# Patient Record
Sex: FEMALE | Race: WHITE | NOT HISPANIC OR LATINO | Employment: OTHER | URBAN - METROPOLITAN AREA
[De-identification: names, ages, dates, MRNs, and addresses within clinical notes are randomized per-mention and may not be internally consistent; named-entity substitution may affect disease eponyms.]

---

## 2023-02-23 LAB — URINE CULTURE: NO GROWTH

## 2023-05-19 ENCOUNTER — HOSPITAL ENCOUNTER (OUTPATIENT)
Dept: DATA CONVERSION | Facility: HOSPITAL | Age: 63
Setting detail: OBSERVATION
Discharge: HOME | End: 2023-05-20
Attending: SURGERY | Admitting: SURGERY

## 2023-05-19 DIAGNOSIS — M19.90 UNSPECIFIED OSTEOARTHRITIS, UNSPECIFIED SITE: ICD-10-CM

## 2023-05-19 DIAGNOSIS — M79.7 FIBROMYALGIA: ICD-10-CM

## 2023-05-19 DIAGNOSIS — K21.9 GASTRO-ESOPHAGEAL REFLUX DISEASE WITHOUT ESOPHAGITIS: ICD-10-CM

## 2023-05-19 DIAGNOSIS — Z91.040 LATEX ALLERGY STATUS: ICD-10-CM

## 2023-05-19 DIAGNOSIS — K22.0 ACHALASIA OF CARDIA: ICD-10-CM

## 2023-05-19 DIAGNOSIS — Z88.0 ALLERGY STATUS TO PENICILLIN: ICD-10-CM

## 2023-05-19 DIAGNOSIS — Z79.85 LONG-TERM (CURRENT) USE OF INJECTABLE NON-INSULIN ANTIDIABETIC DRUGS: ICD-10-CM

## 2023-05-19 DIAGNOSIS — Z91.041 RADIOGRAPHIC DYE ALLERGY STATUS: ICD-10-CM

## 2023-05-19 DIAGNOSIS — F32.A DEPRESSION, UNSPECIFIED: ICD-10-CM

## 2023-05-19 DIAGNOSIS — K76.0 FATTY (CHANGE OF) LIVER, NOT ELSEWHERE CLASSIFIED: ICD-10-CM

## 2023-05-19 DIAGNOSIS — G43.909 MIGRAINE, UNSPECIFIED, NOT INTRACTABLE, WITHOUT STATUS MIGRAINOSUS: ICD-10-CM

## 2023-05-19 DIAGNOSIS — Z79.84 LONG TERM (CURRENT) USE OF ORAL HYPOGLYCEMIC DRUGS: ICD-10-CM

## 2023-05-19 DIAGNOSIS — E11.9 TYPE 2 DIABETES MELLITUS WITHOUT COMPLICATIONS (MULTI): ICD-10-CM

## 2023-05-19 DIAGNOSIS — D64.9 ANEMIA, UNSPECIFIED: ICD-10-CM

## 2023-05-19 DIAGNOSIS — K58.9 IRRITABLE BOWEL SYNDROME WITHOUT DIARRHEA: ICD-10-CM

## 2023-05-19 DIAGNOSIS — N39.3 STRESS INCONTINENCE (FEMALE) (MALE): ICD-10-CM

## 2023-05-19 DIAGNOSIS — E78.5 HYPERLIPIDEMIA, UNSPECIFIED: ICD-10-CM

## 2023-05-19 DIAGNOSIS — M54.50 LOW BACK PAIN, UNSPECIFIED: ICD-10-CM

## 2023-05-19 DIAGNOSIS — R13.10 DYSPHAGIA, UNSPECIFIED: ICD-10-CM

## 2023-05-19 DIAGNOSIS — I10 ESSENTIAL (PRIMARY) HYPERTENSION: ICD-10-CM

## 2023-05-19 DIAGNOSIS — M54.2 CERVICALGIA: ICD-10-CM

## 2023-05-19 DIAGNOSIS — R00.0 TACHYCARDIA, UNSPECIFIED: ICD-10-CM

## 2023-05-19 DIAGNOSIS — J45.909 UNSPECIFIED ASTHMA, UNCOMPLICATED (HHS-HCC): ICD-10-CM

## 2023-05-19 DIAGNOSIS — F41.9 ANXIETY DISORDER, UNSPECIFIED: ICD-10-CM

## 2023-05-19 LAB
POCT GLUCOSE: 120 MG/DL (ref 74–99)
POCT GLUCOSE: 169 MG/DL (ref 74–99)
POCT GLUCOSE: 238 MG/DL (ref 74–99)

## 2023-05-20 LAB
POCT GLUCOSE: 111 MG/DL (ref 74–99)
POCT GLUCOSE: 127 MG/DL (ref 74–99)
POCT GLUCOSE: 96 MG/DL (ref 74–99)

## 2023-06-25 LAB — URINE CULTURE: ABNORMAL

## 2023-09-07 VITALS — BODY MASS INDEX: 31.07 KG/M2 | WEIGHT: 186.51 LBS | HEIGHT: 65 IN

## 2023-09-30 NOTE — H&P
History & Physical Reviewed:   I have reviewed the History and Physical dated:  04-May-2023   History and Physical reviewed and relevant findings noted. Patient examined to review pertinent physical  findings.: No significant changes   Home Medications Reviewed: no changes noted   Allergies Reviewed: no changes noted       ERAS (Enhanced Recovery After Surgery):  ·  ERAS Patient: no     Consent:   COVID-19 Consent:  ·  COVID-19 Risk Consent Surgeon has reviewed key risks related to the risk of rasheed COVID-19 and if they contract COVID-19 what the risks are.     Attestation:   Note Completion:  I am a:  Resident/Fellow   Attending Attestation I saw and evaluated the patient.  I personally obtained the key and critical portions of the history and physical exam or was physically present for key and  critical portions performed by the resident/fellow. I reviewed the resident/fellow?s documentation and discussed the patient with the resident/fellow.  I agree with the resident/fellow?s medical decision making as documented in the note.     I personally evaluated the patient on 19-May-2023         Electronic Signatures:  Efrain Johnson)  (Signed 19-May-2023 06:43)   Authored: History & Physical Reviewed, ERAS, Consent,  Note Completion  Dipak De Leon)  (Signed 19-May-2023 09:21)   Authored: Note Completion   Co-Signer: History & Physical Reviewed, ERAS, Consent, Note Completion      Last Updated: 19-May-2023 09:21 by Dipak De Leon)

## 2023-09-30 NOTE — DISCHARGE SUMMARY
Send Summary:   Discharge Summary Providers:  Provider Role Provider Name   · Referring Nohemi Zee   · Attending Dipak De Leon   · Primary ERIC Romo       Note Recipients: Dipak De Leon MD - [id number]  [Preferred]       Discharge:    Summary:   Admission Date: .19-May-2023 06:29:00   Discharge Date: 20-May-2023   Attending Physician at Discharge: Dipak De Leon   Admission Reason: Achalasia   Final Discharge Diagnoses: S/P peroral endoscopic  myotomy   Procedures: Date: 19-May-2023 09:18:00  Procedure Name: 1. POEM   Condition at Discharge: Satisfactory   Disposition at Discharge: .Home   Hospital Course:    Ms. Shruthi Fan is a 63 y/o female status post peroral endoscopic myotomy for achalasia on 5/19/23 with Dr. De Leon. She tolerated the procedure well with post-endoscope  pain well managed. She is tolerating her diet, ambulating, and having bowel movements. She had an oxygen requirement initially post-op and has been working with the incentive spirometer. She has been weaned off and is medically clear for discharge with  post-op follow up planned for two weeks with Dr. De Leon.       Discharge Information:    and Continuing Care:   Lab Results - Pending:    None  Radiology Results - Pending: None   Discharge Instructions:    Activity:           activity as tolerated.          May shower..            May return to school/work.            May not drive.  While taking narcotic pain medications    Nutrition/Diet:           full liquids    Follow Up Appointments:    Coumadin (Warfarin) Follow-Up Monitoring:           Phone Number:   [phone]    Follow-Up Appointment 01:           Physician/Dept/Service:   [facility]          Reason for Referral:   Please call to schedule follow up appointment for two weeks from now.          Call to Schedule in:   2-3 days          Phone Number:   [phone]    Discharge Medications: Home Medication   ZyrTEC 10 mg oral tablet - 1 tab(s)  orally once a day  Breo Ellipta 100 mcg-25 mcg/inh inhalation powder - 1 puff(s) inhaled once a day  atenolol 100 mg oral tablet - orally once a day  amLODIPine 10 mg oral tablet - 1 tab(s) orally once a day  atorvastatin 10 mg oral tablet - 1 tab(s) orally once a day  desvenlafaxine (as base) 100 mg oral tablet, extended release - 1 tab(s) orally once a day in the morning   ezetimibe 10 mg oral tablet - 1 tab(s) orally once a day  Gemtesa 75 mg oral tablet - 1 tab(s) orally once a day  losartan 100 mg oral tablet - 1 tab(s) orally once a day in the morning   zolpidem 10 mg oral tablet - 1 tab(s) orally once a day (at bedtime)  triamterene-hydrochlorothiazide 37.5 mg-25 mg oral capsule - 1 cap(s) orally once a day in the morning   QUEtiapine 200 mg oral tablet, extended release - 1 tab(s) orally once a day (in the evening) at 7pm   metFORMIN 500 mg oral tablet - 1 tab(s) orally once a day  busPIRone 15 mg oral tablet - 1 tab(s) orally 3 times a day  Azo cranberry oral capsule - 2 cap(s) orally once a day  omeprazole 20 mg oral delayed release capsule - 1 cap(s) orally 3 times a day  Trintellix 20 mg oral tablet - 1 tab(s) orally once a day  Trulicity Pen 1.5 mg/0.5 mL subcutaneous solution - inject 1.5 mg under the skin every wednesday   Cinnamon -  2 capsules (1000 milligram(s)) orally once a day   Benadryl 25 mg oral tablet - 1 tab(s) orally 2 times a day  Ciprodex 0.3%-0.1% otic suspension - 4 drop(s) to  both ears 2 times a day  dicyclomine 20 mg oral tablet - 1 tab(s) orally 4 times a day  EPINEPHrine 0.3 mg injectable kit - inject 0.3mg under the skin as needed for anaphylaxis   montelukast 10 mg oral tablet - 1 tab(s) orally once a day  Narcan 4 mg/0.1 mL nasal spray - use as directed as needed for opioid overdose   pramipexole 1 mg oral tablet - 1 tab(s) orally once a day at dinner   PreviDent 5000 Dry Mouth 1.1% topical gel - Apply topically to affected areas twice daily as directed   Jardiance 10 mg oral  tablet - 1 tab(s) orally once a day (in the morning)  Potassium Chloride (Eqv-K-Tab) 20 mEq oral tablet, extended release - 1 tab(s) orally 3 times a day  pregabalin 150 mg oral capsule - 1 cap(s) orally 4 times a day  triamcinolone 0.1% topical cream - Apply topically to affected area as needed   hyoscyamine 0.125 mg sublingual tablet - 1 tab(s) sublingually 3 to 4 times a day as needed   PreserVision AREDS 2 oral tablet, chewable - 1 tab(s) orally 2 times a day  Refresh ophthalmic solution - 1 drop(s) to both eyes 4 times a day  Lacri-Lube S.O.P. ophthalmic ointment - 1 application to both  eyes once a day at bedtime      PRN Medication   Imodium 2 mg oral capsule - 1 cap(s) orally every 4 hours, As Needed  rizatriptan 10 mg oral tablet - 1 tab(s) orally once a day, As Needed  hydrocodone-acetaminophen 5 mg-325 mg oral tablet - 1 tab(s) orally once a day (at bedtime), As Needed  albuterol 90 mcg/inh inhalation aerosol - 2 puff(s) inhaled every 6 hours, As Needed  cyclobenzaprine 10 mg oral tablet - 1 tab(s) orally 3 times a day, As Needed - for spasms     DNR Status:   ·  Code Status Code Status order at time of discharge: Full Code     Attestation:   Note Completion:  I am a:  Resident/Fellow   Attending Attestation I reviewed the resident/fellow?s documentation and discussed the patient with the resident/fellow.  I agree with the resident/fellow?s medical  decision making as documented in the note.          Electronic Signatures:  Dipak De Leon)  (Signed 21-May-2023 17:15)   Authored: Note Completion   Co-Signer: Send Summary, Summary Content, Ongoing Care, DNR Status, Note Completion  Juan Miguel Valle (Resident))  (Signed 20-May-2023 18:57)   Authored: Send Summary, Summary Content, Ongoing Care,  DNR Status, Note Completion      Last Updated: 21-May-2023 17:15 by Dipak De Leon)

## 2023-10-02 NOTE — OP NOTE
Post Operative Note:     PreOp Diagnosis: Achalasia   Post-Procedure Diagnosis: Achalasia   Procedure: 1. POEM   Surgeon: Dr. De Leon   Resident/Fellow/Other Assistant: Dr. Sami Berger   Anesthesia: General   I.V. Fluids: 500   Estimated Blood Loss (mL): 1ml   Blood Replacement: None   Specimen: no   Complications: None   Findings: myotomy performed without incident, EGJ  open and easily transversed at end of case     Attestation:   Note Completion:  I am a: Resident/Fellow   Attending Attestation I was present for the entire procedure          Electronic Signatures:  Juan Miguel Gallardo (Resident))  (Signed 19-May-2023 09:20)   Authored: Post Operative Note, Note Completion  Dipak De Leon)  (Signed 19-May-2023 09:22)   Authored: Note Completion   Co-Signer: Post Operative Note, Note Completion      Last Updated: 19-May-2023 09:22 by Dipak De Leon)

## 2023-10-16 PROBLEM — G89.29 CHRONIC PAIN OF RIGHT KNEE: Status: ACTIVE | Noted: 2023-10-16

## 2023-10-16 PROBLEM — S83.271A COMPLEX TEAR OF LATERAL MENISCUS OF RIGHT KNEE AS CURRENT INJURY: Status: ACTIVE | Noted: 2023-10-16

## 2023-10-16 PROBLEM — N17.9 ACUTE KIDNEY FAILURE (CMS-HCC): Status: ACTIVE | Noted: 2023-10-16

## 2023-10-16 PROBLEM — M17.11 ARTHRITIS OF KNEE, RIGHT: Status: ACTIVE | Noted: 2023-10-16

## 2023-10-16 PROBLEM — M54.41 ACUTE BILATERAL LOW BACK PAIN WITH RIGHT-SIDED SCIATICA: Status: ACTIVE | Noted: 2023-10-16

## 2023-10-16 PROBLEM — R13.19 ESOPHAGEAL DYSPHAGIA: Status: ACTIVE | Noted: 2023-10-16

## 2023-10-16 PROBLEM — N39.0 UTI (URINARY TRACT INFECTION): Status: ACTIVE | Noted: 2023-10-16

## 2023-10-16 PROBLEM — N39.3 SUI (STRESS URINARY INCONTINENCE, FEMALE): Status: ACTIVE | Noted: 2023-10-16

## 2023-10-16 PROBLEM — N30.10 INTERSTITIAL CYSTITIS: Status: ACTIVE | Noted: 2023-10-16

## 2023-10-16 PROBLEM — R30.0 DYSURIA: Status: ACTIVE | Noted: 2023-10-16

## 2023-10-16 PROBLEM — R35.1 NOCTURIA: Status: ACTIVE | Noted: 2023-10-16

## 2023-10-16 PROBLEM — K22.0 ACHALASIA, ESOPHAGEAL: Status: ACTIVE | Noted: 2023-10-16

## 2023-10-16 PROBLEM — R31.9 HEMATURIA: Status: ACTIVE | Noted: 2023-10-16

## 2023-10-16 PROBLEM — S80.02XA CONTUSION OF LEFT KNEE: Status: ACTIVE | Noted: 2023-10-16

## 2023-10-16 PROBLEM — M25.561 CHRONIC PAIN OF RIGHT KNEE: Status: ACTIVE | Noted: 2023-10-16

## 2023-10-16 RX ORDER — EPINEPHRINE 0.3 MG/.3ML
INJECTION SUBCUTANEOUS
COMMUNITY

## 2023-10-16 RX ORDER — FLUTICASONE FUROATE AND VILANTEROL TRIFENATATE 100; 25 UG/1; UG/1
POWDER RESPIRATORY (INHALATION)
COMMUNITY
Start: 2018-11-26

## 2023-10-16 RX ORDER — DULAGLUTIDE 1.5 MG/.5ML
INJECTION, SOLUTION SUBCUTANEOUS
COMMUNITY
Start: 2020-02-25

## 2023-10-16 RX ORDER — QUETIAPINE FUMARATE 50 MG/1
2 TABLET, FILM COATED ORAL DAILY
COMMUNITY
Start: 2019-03-28

## 2023-10-16 RX ORDER — VORTIOXETINE 20 MG/1
20 TABLET, FILM COATED ORAL
COMMUNITY
Start: 2019-11-07

## 2023-10-16 RX ORDER — LORAZEPAM 0.5 MG/1
0.5 TABLET ORAL
COMMUNITY

## 2023-10-16 RX ORDER — DICYCLOMINE HYDROCHLORIDE 20 MG/1
20 TABLET ORAL 4 TIMES DAILY
COMMUNITY

## 2023-10-16 RX ORDER — LANCETS 33 GAUGE
EACH MISCELLANEOUS
COMMUNITY
Start: 2023-01-07

## 2023-10-16 RX ORDER — MONTELUKAST SODIUM 10 MG/1
1 TABLET ORAL NIGHTLY
COMMUNITY
Start: 2021-05-26

## 2023-10-16 RX ORDER — HYDROCODONE BITARTRATE AND ACETAMINOPHEN 5; 325 MG/1; MG/1
1 TABLET ORAL DAILY PRN
COMMUNITY

## 2023-10-16 RX ORDER — DESVENLAFAXINE 100 MG/1
1 TABLET, EXTENDED RELEASE ORAL
COMMUNITY
Start: 2019-03-28

## 2023-10-16 RX ORDER — PREGABALIN 150 MG/1
150 CAPSULE ORAL 4 TIMES DAILY
COMMUNITY

## 2023-10-16 RX ORDER — URINARY ANTISEPTIC ANTISPASMODIC 81.6; 40.8; 10.8; .12 MG/1; MG/1; MG/1; MG/1
1 TABLET ORAL DAILY
COMMUNITY
Start: 2021-12-01

## 2023-10-16 RX ORDER — CIPROFLOXACIN 250 MG/1
250 TABLET, FILM COATED ORAL 2 TIMES DAILY
COMMUNITY
Start: 2023-03-27

## 2023-10-16 RX ORDER — CRANBERRY CONC/C/BACILL COAG 250-30-15
TABLET ORAL
COMMUNITY

## 2023-10-16 RX ORDER — CYCLOBENZAPRINE HCL 10 MG
10 TABLET ORAL
COMMUNITY
Start: 2019-06-24

## 2023-10-16 RX ORDER — CIPROFLOXACIN AND DEXAMETHASONE 3; 1 MG/ML; MG/ML
SUSPENSION/ DROPS AURICULAR (OTIC)
COMMUNITY

## 2023-10-16 RX ORDER — NYSTATIN 100000 [USP'U]/ML
SUSPENSION ORAL 4 TIMES DAILY
COMMUNITY
Start: 2023-05-08

## 2023-10-16 RX ORDER — TRIAMCINOLONE ACETONIDE 1 MG/G
CREAM TOPICAL
COMMUNITY
Start: 2020-04-29

## 2023-10-16 RX ORDER — POTASSIUM CHLORIDE 1500 MG/1
20 TABLET, EXTENDED RELEASE ORAL
COMMUNITY
Start: 2019-12-17

## 2023-10-16 RX ORDER — CETIRIZINE HYDROCHLORIDE 10 MG/1
10 TABLET ORAL
COMMUNITY

## 2023-10-16 RX ORDER — VIBEGRON 75 MG/1
1 TABLET, FILM COATED ORAL DAILY
COMMUNITY
Start: 2022-04-26 | End: 2023-12-13

## 2023-10-16 RX ORDER — PHENYLEPHRINE HCL 10 MG
TABLET ORAL
COMMUNITY

## 2023-10-16 RX ORDER — METFORMIN HYDROCHLORIDE 500 MG/1
1 TABLET ORAL 2 TIMES DAILY
COMMUNITY
Start: 2018-09-04

## 2023-10-16 RX ORDER — BUSPIRONE HYDROCHLORIDE 15 MG/1
15 TABLET ORAL 3 TIMES DAILY
COMMUNITY
Start: 2019-01-31

## 2023-10-16 RX ORDER — TRIAMTERENE/HYDROCHLOROTHIAZID 37.5-25 MG
TABLET ORAL
COMMUNITY
Start: 2019-06-24

## 2023-10-16 RX ORDER — ATORVASTATIN CALCIUM 10 MG/1
10 TABLET, FILM COATED ORAL DAILY
COMMUNITY
Start: 2018-10-31

## 2023-10-16 RX ORDER — LOSARTAN POTASSIUM 100 MG/1
1 TABLET ORAL DAILY
COMMUNITY
Start: 2018-09-28

## 2023-10-16 RX ORDER — FLUORIDE (SODIUM) 1.1 %
PASTE (ML) DENTAL
COMMUNITY
Start: 2023-04-17

## 2023-10-16 RX ORDER — HYOSCYAMINE SULFATE 0.12 MG/1
0.12 TABLET, ORALLY DISINTEGRATING ORAL 4 TIMES DAILY
COMMUNITY
Start: 2022-11-10

## 2023-10-16 RX ORDER — EMPAGLIFLOZIN 10 MG/1
10 TABLET, FILM COATED ORAL
COMMUNITY
Start: 2023-03-11

## 2023-10-16 RX ORDER — RIZATRIPTAN BENZOATE 10 MG/1
10 TABLET ORAL
COMMUNITY

## 2023-10-16 RX ORDER — FLUCONAZOLE 200 MG/1
200 TABLET ORAL
COMMUNITY
Start: 2023-05-08

## 2023-10-16 RX ORDER — OMEPRAZOLE 20 MG/1
20 CAPSULE, DELAYED RELEASE ORAL
COMMUNITY
Start: 2019-06-24

## 2023-10-16 RX ORDER — NALOXONE HYDROCHLORIDE 0.4 MG/ML
0.4 INJECTION, SOLUTION INTRAMUSCULAR; INTRAVENOUS; SUBCUTANEOUS
COMMUNITY

## 2023-10-16 RX ORDER — PRAMIPEXOLE DIHYDROCHLORIDE 1 MG/1
1 TABLET ORAL
COMMUNITY
Start: 2020-05-26

## 2023-10-16 RX ORDER — ALBUTEROL SULFATE 90 UG/1
AEROSOL, METERED RESPIRATORY (INHALATION)
COMMUNITY

## 2023-10-16 RX ORDER — EZETIMIBE 10 MG/1
10 TABLET ORAL
COMMUNITY
Start: 2019-06-24

## 2023-10-16 RX ORDER — HYOSCYAMINE SULFATE 0.12 MG/1
0.12 TABLET, ORALLY DISINTEGRATING ORAL EVERY 4 HOURS PRN
COMMUNITY
Start: 2023-04-17

## 2023-10-16 RX ORDER — CEPHALEXIN 250 MG/1
250 CAPSULE ORAL 2 TIMES DAILY
COMMUNITY
Start: 2023-02-10

## 2023-10-16 RX ORDER — BENZONATATE 200 MG/1
200 CAPSULE ORAL
COMMUNITY

## 2023-10-16 RX ORDER — AMLODIPINE BESYLATE 10 MG/1
10 TABLET ORAL
COMMUNITY
Start: 2019-06-24

## 2023-10-16 RX ORDER — BLOOD SUGAR DIAGNOSTIC
STRIP MISCELLANEOUS
COMMUNITY

## 2023-10-16 RX ORDER — CLOTRIMAZOLE AND BETAMETHASONE DIPROPIONATE 10; .64 MG/G; MG/G
1 CREAM TOPICAL
COMMUNITY
Start: 2022-11-03

## 2023-10-16 RX ORDER — ZOLPIDEM TARTRATE 10 MG/1
1 TABLET ORAL NIGHTLY PRN
COMMUNITY
Start: 2018-12-06

## 2023-10-16 RX ORDER — DIMETHYL SULFOXIDE 0.54 G/ML
IRRIGANT INTRAVESICAL
COMMUNITY
Start: 2023-02-28

## 2023-10-16 RX ORDER — ATENOLOL 100 MG/1
100 TABLET ORAL
COMMUNITY
Start: 2019-06-24

## 2023-10-25 ENCOUNTER — APPOINTMENT (OUTPATIENT)
Dept: UROLOGY | Facility: CLINIC | Age: 63
End: 2023-10-25
Payer: COMMERCIAL

## 2023-10-25 ENCOUNTER — OFFICE VISIT (OUTPATIENT)
Dept: UROLOGY | Facility: CLINIC | Age: 63
End: 2023-10-25
Payer: COMMERCIAL

## 2023-10-25 VITALS — RESPIRATION RATE: 16 BRPM | WEIGHT: 189 LBS | BODY MASS INDEX: 29.6 KG/M2

## 2023-10-25 DIAGNOSIS — R35.1 NOCTURIA: ICD-10-CM

## 2023-10-25 DIAGNOSIS — N39.3 SUI (STRESS URINARY INCONTINENCE, FEMALE): ICD-10-CM

## 2023-10-25 DIAGNOSIS — N30.10 INTERSTITIAL CYSTITIS: ICD-10-CM

## 2023-10-25 DIAGNOSIS — R30.0 DYSURIA: Primary | ICD-10-CM

## 2023-10-25 PROCEDURE — 1036F TOBACCO NON-USER: CPT | Performed by: UROLOGY

## 2023-10-25 PROCEDURE — 99213 OFFICE O/P EST LOW 20 MIN: CPT | Performed by: UROLOGY

## 2023-10-25 NOTE — PROGRESS NOTES
Subjective   Patient ID: Shruthi Fan is a 62 y.o. female.    HPI  Patient is here for 3 month f/u. Hx of IC.  She has been having flare ups. She has done DMSO irrigations. Last cysto on 3/23.  She is taking Macrodantin 100mg MWF. Chronic LUT'S sx are mild and stable. Some urgency and frequency. Denies dysuria. Denies hematuria. Nocturia x1. No medication for LUT'S.        Review of Systems   Constitutional:  Negative for chills and fever.   HENT: Negative.     Eyes: Negative.    Respiratory:  Negative for cough and shortness of breath.    Cardiovascular:  Negative for chest pain and leg swelling.   Gastrointestinal:  Negative for nausea.   Endocrine: Negative.    Genitourinary:  Negative for difficulty urinating.        Negative except for documented in HPI   Allergic/Immunologic: Negative.    Neurological:         Alert & oriented X 3   Hematological:         Denies blood thinners   Psychiatric/Behavioral: Negative.       Physical Exam  Vitals and nursing note reviewed.   Pulmonary:      Effort: Pulmonary effort is normal.      Breath sounds: Normal breath sounds.   Abdominal:      Palpations: Abdomen is soft.      Tenderness: There is no abdominal tenderness.   Genitourinary:     Comments: Kidneys non palpable bilaterally  Bladder non palpable or tender  Neurological:      Mental Status: She is alert.        Objective       Assessment/Plan     There are no diagnoses linked to this encounter.    Treatment options for LUTS reviewed  Urogesic Blue give  Discussed timed voiding. Discussed fluid and caffeine intake  Lifestyle change to help prevent UTIs discussed. Encouraged fluid intake.  Cx ordered if Sx persisit    F/u 1 month

## 2023-11-29 ASSESSMENT — ENCOUNTER SYMPTOMS
SHORTNESS OF BREATH: 0
NAUSEA: 0
EYES NEGATIVE: 1
ENDOCRINE NEGATIVE: 1
COUGH: 0
FEVER: 0
CHILLS: 0
DIFFICULTY URINATING: 0
PSYCHIATRIC NEGATIVE: 1
ALLERGIC/IMMUNOLOGIC NEGATIVE: 1

## 2023-11-29 NOTE — PROGRESS NOTES
Subjective   Patient ID: Shruthi Fan is a 63 y.o. female.    HPI  Hx of IC.  She has been having flare ups. She has done DMSO irrigations. Last cysto on 3/23.  She is taking Macrodantin 100mg MWF.  Also taking Urogesic blue. Chronic LUT'S sx are mild and stable. Some urgency and frequency. Denies dysuria. Denies hematuria. Nocturia x1. No medication for LUT'S.           Review of Systems   Constitutional:  Negative for chills and fever.   HENT: Negative.     Eyes: Negative.    Respiratory:  Negative for cough and shortness of breath.    Cardiovascular:  Negative for chest pain and leg swelling.   Gastrointestinal:  Negative for nausea.   Endocrine: Negative.    Genitourinary:  Negative for difficulty urinating.        Negative except for documented in HPI   Allergic/Immunologic: Negative.    Neurological:         Alert & oriented X 3   Hematological:         Denies blood thinners   Psychiatric/Behavioral: Negative.         Objective   Physical Exam  Vitals and nursing note reviewed.   Pulmonary:      Effort: Pulmonary effort is normal.      Breath sounds: Normal breath sounds.   Abdominal:      Palpations: Abdomen is soft.      Tenderness: There is no abdominal tenderness.   Genitourinary:     Comments: Kidneys non palpable bilaterally  Bladder non palpable or tender  Neurological:      Mental Status: She is alert.         Assessment/Plan   Diagnoses and all orders for this visit:  Nocturia  ELAINA (stress urinary incontinence, female)  Interstitial cystitis      Treatment options for LUTS reviewed  IC diet reviewed  Continue Urogesic Blue  Discussed timed voiding. Discussed fluid and caffeine intake  Lifestyle change to help prevent UTIs discussed. Encouraged fluid intake.  Discontinue Macrodantin Prophylaxis  Trimethoprim Rx given      F/u  3 months with UA

## 2023-11-30 ENCOUNTER — OFFICE VISIT (OUTPATIENT)
Dept: UROLOGY | Facility: CLINIC | Age: 63
End: 2023-11-30
Payer: COMMERCIAL

## 2023-11-30 VITALS — RESPIRATION RATE: 16 BRPM | WEIGHT: 190 LBS | BODY MASS INDEX: 29.76 KG/M2

## 2023-11-30 DIAGNOSIS — N30.10 INTERSTITIAL CYSTITIS: ICD-10-CM

## 2023-11-30 DIAGNOSIS — R35.1 NOCTURIA: ICD-10-CM

## 2023-11-30 DIAGNOSIS — N39.3 SUI (STRESS URINARY INCONTINENCE, FEMALE): ICD-10-CM

## 2023-11-30 PROCEDURE — 1036F TOBACCO NON-USER: CPT | Performed by: UROLOGY

## 2023-11-30 PROCEDURE — 99214 OFFICE O/P EST MOD 30 MIN: CPT | Performed by: UROLOGY

## 2023-11-30 RX ORDER — TRIMETHOPRIM 100 MG/1
TABLET ORAL
Qty: 15 TABLET | Refills: 6 | Status: SHIPPED | OUTPATIENT
Start: 2023-11-30

## 2023-12-13 DIAGNOSIS — R35.1 NOCTURIA: Primary | ICD-10-CM

## 2023-12-13 RX ORDER — VIBEGRON 75 MG/1
1 TABLET, FILM COATED ORAL DAILY
Qty: 90 TABLET | Refills: 3 | Status: SHIPPED | OUTPATIENT
Start: 2023-12-13 | End: 2024-03-20 | Stop reason: SDUPTHER

## 2024-02-01 ENCOUNTER — OFFICE VISIT (OUTPATIENT)
Dept: SURGERY | Facility: CLINIC | Age: 64
End: 2024-02-01
Payer: COMMERCIAL

## 2024-02-01 VITALS
SYSTOLIC BLOOD PRESSURE: 124 MMHG | BODY MASS INDEX: 30.99 KG/M2 | HEIGHT: 65 IN | OXYGEN SATURATION: 95 % | HEART RATE: 86 BPM | TEMPERATURE: 97.8 F | DIASTOLIC BLOOD PRESSURE: 80 MMHG | WEIGHT: 186 LBS

## 2024-02-01 DIAGNOSIS — K22.0 ACHALASIA: ICD-10-CM

## 2024-02-01 DIAGNOSIS — K22.0 ACHALASIA, ESOPHAGEAL: Primary | ICD-10-CM

## 2024-02-01 PROCEDURE — 1036F TOBACCO NON-USER: CPT | Performed by: SURGERY

## 2024-02-01 PROCEDURE — 99214 OFFICE O/P EST MOD 30 MIN: CPT | Performed by: SURGERY

## 2024-02-01 ASSESSMENT — ENCOUNTER SYMPTOMS
WEAKNESS: 0
LIGHT-HEADEDNESS: 0
COUGH: 1
CHILLS: 0
FEVER: 0
DYSURIA: 0
RHINORRHEA: 0
DIZZINESS: 0
EYE REDNESS: 0
VOMITING: 0
WOUND: 0
PALPITATIONS: 0
JOINT SWELLING: 1
SORE THROAT: 1
NERVOUS/ANXIOUS: 0
NAUSEA: 0
HEMATURIA: 0
ARTHRALGIAS: 1
SHORTNESS OF BREATH: 0
CONFUSION: 0
ABDOMINAL PAIN: 1

## 2024-02-01 ASSESSMENT — PAIN SCALES - GENERAL: PAINLEVEL: 2

## 2024-02-01 NOTE — H&P
General Surgery Outpatient Clinic Note      History Of Present Illness  Shruthi Fan is a 63 y.o. female with achalasia s/p POEM in 5/2023 presenting with hematemesis. Patient did well after her POEM but did not undergo Bravo probe testing postoperatively. Since January, she has started having progressive burning pain from her throat, down through her chest, and into her RUQ/stomach area. This is present constantly but is worsened by oral intake. She reports occasional episodes of emesis of what she describes as gastric acid with at most 1 tsp of bright red blood. She also reports coughing with oral intake but feels like food is going through okay. She is on a PPI TID with no relief. She had symptoms apprx daily initially. Since she has started sleeping upright, eating smaller bites, and drinking more water, her symptoms now occur 2-3 times per week. She reports that her current Sx are reminiscent of what she experienced with her achalasia prior to POEM.     Past Medical History  Patient Active Problem List   Diagnosis    Acute bilateral low back pain with right-sided sciatica    Acute kidney failure (CMS/HCC)    Complex tear of lateral meniscus of right knee as current injury    Dysuria    Esophageal dysphagia    Hematuria    Nocturia    Arthritis of knee, right    Contusion of left knee    ELAINA (stress urinary incontinence, female)    UTI (urinary tract infection)    Achalasia, esophageal    Chronic pain of right knee    Interstitial cystitis   IBS    Surgical History  POEM 5/2023  Past Surgical History:   Procedure Laterality Date    MR HEAD ANGIO WO IV CONTRAST  7/25/2022    MR HEAD ANGIO WO IV CONTRAST 7/25/2022    MR NECK ANGIO WO IV CONTRAST  7/25/2022    MR NECK ANGIO WO IV CONTRAST 7/25/2022    OTHER SURGICAL HISTORY  10/08/2019    Carpal tunnel surgery    OTHER SURGICAL HISTORY  10/08/2019    Gallbladder surgery    OTHER SURGICAL HISTORY  10/08/2019    Lumpectomy    OTHER SURGICAL HISTORY  11/14/2019     Knee surgery        Social History  Neg x3    Family History  Family History   Problem Relation Name Age of Onset    Lung cancer Mother      Breast cancer Mother      Other (CARDIAC DISORDER) Father      Diabetes Father      Hypertension Father      Lung cancer Father      Lung cancer Brother          Allergies  Bacitracin zinc-polymyxin b, Budesonide-formoterol, Doxycycline, Fenofibrate, Fish containing products, Iodinated contrast media, Lisinopril, Midazolam, Nitrofurantoin macrocrystal, Nut - unspecified, Penicillins, Propoxyphene, Shellfish containing products, Simvastatin, Latex, Niacin, and Other    Review of Systems   Constitutional:  Negative for chills and fever.   HENT:  Positive for sore throat (Per HPI). Negative for rhinorrhea.    Eyes:  Negative for redness and visual disturbance.   Respiratory:  Positive for cough (Per HPI). Negative for shortness of breath.    Cardiovascular:  Negative for chest pain and palpitations.   Gastrointestinal:  Positive for abdominal pain (Per HPI). Negative for nausea and vomiting.   Genitourinary:  Negative for dysuria and hematuria.   Musculoskeletal:  Positive for arthralgias (s/p recent TKR) and joint swelling (s/p recent TKR).   Skin:  Negative for rash and wound.   Neurological:  Negative for dizziness, syncope, weakness and light-headedness.   Psychiatric/Behavioral:  Negative for confusion. The patient is not nervous/anxious.        Home Meds   Current Outpatient Medications   Medication Instructions    amLODIPine (NORVASC) 10 mg, oral, TAKE PER DIRECTED    atenolol (TENORMIN) 100 mg, oral, TAKE PER DIRECTED    atorvastatin (LIPITOR) 10 mg, oral, Daily    benzonatate (TESSALON) 200 mg, oral, TAKE PER DIRECTED    Breo Ellipta 100-25 mcg/dose inhaler inhalation, TAKE PER DIRECTED    busPIRone (BUSPAR) 15 mg, oral, 3 times daily    cephalexin (KEFLEX) 250 mg, oral, 2 times daily    cetirizine (ZYRTEC) 10 mg, oral, TAKE PER DIRECTED    Cinnamon 500 mg capsule oral,  TAKE PER DIRECTED    ciprofloxacin (CIPRO) 250 mg, oral, 2 times daily    ciprofloxacin-dexamethasone (Ciprodex) otic suspension otic (ear), APPLY PER DIRECTED    clotrimazole-betamethasone (Lotrisone) cream 1 Application, Topical, APPLY PER DIRECTED    cranberry conc-C-bacillus coag (Azo Cranberry Plus Probiotic) 250-30-15 mg tablet oral, TAKE PER DIRECTED    cyclobenzaprine (FLEXERIL) 10 mg, oral, TAKE PER DIRECTED    desvenlafaxine 100 mg 24 hr tablet 1 tablet, oral, Daily before breakfast    dicyclomine (BENTYL) 20 mg, oral, 4 times daily    EPINEPHrine 0.3 mg/0.3 mL injection syringe intramuscular, PER DIRECTED    ezetimibe (ZETIA) 10 mg, oral, TAKE PER DIRECTED    fluconazole (DIFLUCAN) 200 mg, oral, TAKE PER DIRECTED 1 TIME ONLY . TAKE THE DAY BEFORE PROCEDURE    Gemtesa 75 mg, oral, Daily    HYDROcodone-acetaminophen (Norco) 5-325 mg tablet 1 tablet, oral, Daily PRN    hyoscyamine (ANASPAZ) 0.125 mg, oral, Every 4 hours PRN    hyoscyamine (OSCIMIN SL) 0.125 mg, sublingual, 4 times daily, PER DIRECTED    Jardiance 10 mg, oral, TAKE PER DIRECTED    LORazepam (ATIVAN) 0.5 mg, oral, TAKE PER DIRECTED    losartan (Cozaar) 100 mg tablet 1 tablet, oral, Daily    metFORMIN (Glucophage) 500 mg tablet 1 tablet, oral, 2 times daily    methen-sod phos-meth blue-hyos (Urogesic-Blue) 81.6-40.8-0.12 mg tablet 1 tablet, oral, Daily, TAKE PER DIRECTED    montelukast (Singulair) 10 mg tablet 1 tablet, oral, Nightly    naloxone (NARCAN) 0.4 mg, PER DIRECTED    nystatin (Mycostatin) 100,000 unit/mL suspension oral, 4 times daily, TAKE PER DIRECTED    omeprazole (PRILOSEC) 20 mg, oral, TAKE PER DIRECTED    OneTouch Delica Plus Lancet 33 gauge misc USE PER DIRECTED FOR TESTING BLOOD SUGAR 2 TIMES DAILY    OneTouch Ultra Test strip TEST PER DIRECTED    potassium chloride CR 20 mEq ER tablet 20 mEq, oral, TAKE PER DIRECTED    pramipexole (MIRAPEX) 1 mg, oral, TAKE PER DIRECTED    pregabalin (LYRICA) 150 mg, oral, 4 times daily     PreviDent 5000 Booster Plus 1.1 % dental paste dental, TAKE PER DIRECTED    ProAir HFA 90 mcg/actuation inhaler inhalation, TAKE PER DIRECTED    QUEtiapine (SEROquel) 50 mg tablet 2 tablets, oral, Daily, PER DIRECTED    Rimso-50 50 % intravesical solution intravesical, PER DIRECTED    rizatriptan (MAXALT) 10 mg, oral, TAKE PER DIRECTED    triamcinolone (Kenalog) 0.1 % cream Topical, APPLY PER DIRECTED    triamterene-hydrochlorothiazid (Maxzide-25) 37.5-25 mg tablet oral, PER DIRECTED    trimethoprim (Trimpex) 100 mg tablet Take 1 tab PO EVERY MONDAY, WEDNESDAY, FRIDAY    Trintellix 20 mg, oral, TAKE PER DIRECTED    Trulicity 1.5 mg/0.5 mL pen injector injection subcutaneous, PER DIRECTED    zolpidem (Ambien) 10 mg tablet 1 tablet, oral, Nightly PRN       Last Recorded Vitals    Temp:  [36.6 °C (97.8 °F)] 36.6 °C (97.8 °F)  Heart Rate:  [86] 86  BP: (124)/(80) 124/80      Physical Exam  General: laying in bed, NAD  Head: normocephalic, atraumatic  ENT: mucosa are moist   Respiratory: nonlabored breathing on room air  CV: RRR  GI: abdomen is soft, nontender, nondistended  MSK: NADIA  Extremities: no swelling or deformity of b/l LEs   Neuro: CN II-XII grossly intact. Sensation to light touch intact        Assessment   Ms. Fan, 63yoF with Hx noted above, presenting for hematemesis, abdominal pain, chest pain, and dysphagia. Symptoms could reflect GERD, ERD, or recurrent achalasia. Will need additional work up to assess.    Plan  - schedule for manometry, EGD, and bravo probe placement with Dr. De Leon.  Risks explained including bleeding, infection, perforation, and premature dislodgment of Bravo capsule.  Based on the results of the manometry and Bravo capsule we will consider further medical treatment for reflux disease or consider reintervention for incomplete myotomy if identified.  - counseled patient to stop PPI 1wk prior to procedure   - continue to sleep upright, take small bites, chew well, and drink  water      Discussed with Attending Physician    Patsy Montero MD  General Surgery PGY 5  [facility] [id number]

## 2024-02-20 ASSESSMENT — ENCOUNTER SYMPTOMS
SHORTNESS OF BREATH: 0
DIFFICULTY URINATING: 0
NAUSEA: 0
FEVER: 0
PSYCHIATRIC NEGATIVE: 1
EYES NEGATIVE: 1
ALLERGIC/IMMUNOLOGIC NEGATIVE: 1
CHILLS: 0
ENDOCRINE NEGATIVE: 1
COUGH: 0

## 2024-02-20 NOTE — PROGRESS NOTES
Subjective   Patient ID: Shruthi Fan is a 63 y.o. female.    HPI  Patient is here for 3 month follow up for Hx of IC.   She has done DMSO irrigations. Last cysto on 3/23.  She is taking Trimethoprim 100mg MWF.  Also taking Urogesic blue PRN. Chronic LUT'S sx are mild and stable. Some urgency and frequency. Denies dysuria. Denies hematuria. Nocturia x1. She is taking Gemtesa which has improved frequency.       Review of Systems   Constitutional:  Negative for chills and fever.   HENT: Negative.     Eyes: Negative.    Respiratory:  Negative for cough and shortness of breath.    Cardiovascular:  Negative for chest pain and leg swelling.   Gastrointestinal:  Negative for nausea.   Endocrine: Negative.    Genitourinary:  Negative for difficulty urinating.        Negative except for documented in HPI   Allergic/Immunologic: Negative.    Neurological:         Alert & oriented X 3   Hematological:         Denies blood thinners   Psychiatric/Behavioral: Negative.         Objective   Physical Exam  Vitals and nursing note reviewed.   Pulmonary:      Effort: Pulmonary effort is normal.      Breath sounds: Normal breath sounds.   Abdominal:      Palpations: Abdomen is soft.      Tenderness: There is no abdominal tenderness.   Genitourinary:     Comments: Kidneys non palpable bilaterally  Bladder non palpable or tender  Neurological:      Mental Status: She is alert.         Assessment/Plan   Diagnoses and all orders for this visit:  Interstitial cystitis  Nocturia  ELAINA (stress urinary incontinence, female)  Urinary frequency      Treatment options for LUTS reviewed  Continue Gemtesa-Samples given  Urogesic Blue Rx refilled for PRN use  Discussed timed voiding. Discussed fluid and caffeine intake  Lifestyle change to help prevent UTIs discussed. Encouraged fluid intake.  Continue Trimethoprim QMWF-Rx given  UA reviewed from past      F/u  6 months with UA

## 2024-02-21 ENCOUNTER — APPOINTMENT (OUTPATIENT)
Dept: URBAN - METROPOLITAN AREA CLINIC 204 | Age: 64
Setting detail: DERMATOLOGY
End: 2024-02-22

## 2024-02-21 PROCEDURE — OTHER MIPS QUALITY: OTHER

## 2024-02-21 PROCEDURE — 99213 OFFICE O/P EST LOW 20 MIN: CPT

## 2024-02-21 NOTE — PROCEDURE: MIPS QUALITY
Additional Notes: Documentation for MIPS  purposes only. Full Patient visit note from paper chart is available for review and also scanned in EMA chart.
Quality 226: Preventive Care And Screening: Tobacco Use: Screening And Cessation Intervention: Patient screened for tobacco use and is an ex/non-smoker
Detail Level: Detailed
Quality 111:Pneumonia Vaccination Status For Older Adults: Patient did not receive any pneumococcal conjugate or polysaccharide vaccine on or after their 60th birthday and before the end of the measurement period
Quality 47: Advance Care Plan: Advance Care Planning discussed and documented; advance care plan or surrogate decision maker documented in the medical record.
Quality 110: Preventive Care And Screening: Influenza Immunization: Influenza Immunization not Administered because Patient Refused.

## 2024-02-22 ENCOUNTER — OFFICE VISIT (OUTPATIENT)
Dept: UROLOGY | Facility: CLINIC | Age: 64
End: 2024-02-22
Payer: COMMERCIAL

## 2024-02-22 VITALS — WEIGHT: 184 LBS | RESPIRATION RATE: 16 BRPM | BODY MASS INDEX: 30.62 KG/M2

## 2024-02-22 DIAGNOSIS — R35.0 URINARY FREQUENCY: ICD-10-CM

## 2024-02-22 DIAGNOSIS — R35.1 NOCTURIA: ICD-10-CM

## 2024-02-22 DIAGNOSIS — N39.3 SUI (STRESS URINARY INCONTINENCE, FEMALE): ICD-10-CM

## 2024-02-22 DIAGNOSIS — N30.10 INTERSTITIAL CYSTITIS: ICD-10-CM

## 2024-02-22 PROCEDURE — 1036F TOBACCO NON-USER: CPT | Performed by: UROLOGY

## 2024-02-22 PROCEDURE — 99214 OFFICE O/P EST MOD 30 MIN: CPT | Performed by: UROLOGY

## 2024-02-22 RX ORDER — VIBEGRON 75 MG/1
1 TABLET, FILM COATED ORAL DAILY
Qty: 30 TABLET | Refills: 11 | Status: SHIPPED | OUTPATIENT
Start: 2024-02-22 | End: 2024-03-23

## 2024-02-22 RX ORDER — URINARY ANTISEPTIC ANTISPASMODIC 81.6; 40.8; 10.8; .12 MG/1; MG/1; MG/1; MG/1
TABLET ORAL
Qty: 30 TABLET | Refills: 3 | Status: SHIPPED | OUTPATIENT
Start: 2024-02-22 | End: 2024-03-20 | Stop reason: SDUPTHER

## 2024-03-06 ENCOUNTER — HOSPITAL ENCOUNTER (OUTPATIENT)
Dept: GASTROENTEROLOGY | Facility: HOSPITAL | Age: 64
Setting detail: OUTPATIENT SURGERY
Discharge: HOME | End: 2024-03-06
Payer: COMMERCIAL

## 2024-03-06 VITALS
SYSTOLIC BLOOD PRESSURE: 102 MMHG | DIASTOLIC BLOOD PRESSURE: 57 MMHG | HEART RATE: 83 BPM | OXYGEN SATURATION: 94 % | HEIGHT: 65 IN | WEIGHT: 175 LBS | RESPIRATION RATE: 11 BRPM | BODY MASS INDEX: 29.16 KG/M2

## 2024-03-06 VITALS
HEART RATE: 95 BPM | OXYGEN SATURATION: 98 % | SYSTOLIC BLOOD PRESSURE: 137 MMHG | RESPIRATION RATE: 13 BRPM | DIASTOLIC BLOOD PRESSURE: 80 MMHG

## 2024-03-06 DIAGNOSIS — K22.0 ACHALASIA: ICD-10-CM

## 2024-03-06 PROCEDURE — 43235 EGD DIAGNOSTIC BRUSH WASH: CPT | Performed by: SURGERY

## 2024-03-06 PROCEDURE — 91035 G-ESOPH REFLX TST W/ELECTROD: CPT | Performed by: SURGERY

## 2024-03-06 PROCEDURE — 7100000009 HC PHASE TWO TIME - INITIAL BASE CHARGE

## 2024-03-06 PROCEDURE — 91010 ESOPHAGUS MOTILITY STUDY: CPT | Performed by: SURGERY

## 2024-03-06 PROCEDURE — 91010 ESOPHAGUS MOTILITY STUDY: CPT

## 2024-03-06 PROCEDURE — 7100000010 HC PHASE TWO TIME - EACH INCREMENTAL 1 MINUTE

## 2024-03-06 PROCEDURE — 2500000004 HC RX 250 GENERAL PHARMACY W/ HCPCS (ALT 636 FOR OP/ED): Performed by: SURGERY

## 2024-03-06 PROCEDURE — 2720000007 HC OR 272 NO HCPCS: Performed by: SURGERY

## 2024-03-06 PROCEDURE — 3700000012 HC SEDATION LEVEL 5+ TIME - INITIAL 15 MINUTES 5/> YEARS: Performed by: SURGERY

## 2024-03-06 PROCEDURE — 99152 MOD SED SAME PHYS/QHP 5/>YRS: CPT | Performed by: SURGERY

## 2024-03-06 RX ORDER — FENTANYL CITRATE 50 UG/ML
INJECTION, SOLUTION INTRAMUSCULAR; INTRAVENOUS AS NEEDED
Status: COMPLETED | OUTPATIENT
Start: 2024-03-06 | End: 2024-03-06

## 2024-03-06 RX ADMIN — FENTANYL CITRATE 25 MCG: 50 INJECTION, SOLUTION INTRAMUSCULAR; INTRAVENOUS at 08:55

## 2024-03-06 RX ADMIN — FENTANYL CITRATE 25 MCG: 50 INJECTION, SOLUTION INTRAMUSCULAR; INTRAVENOUS at 08:57

## 2024-03-06 RX ADMIN — FENTANYL CITRATE 25 MCG: 50 INJECTION, SOLUTION INTRAMUSCULAR; INTRAVENOUS at 09:01

## 2024-03-06 RX ADMIN — FENTANYL CITRATE 50 MCG: 50 INJECTION, SOLUTION INTRAMUSCULAR; INTRAVENOUS at 08:53

## 2024-03-06 RX ADMIN — FENTANYL CITRATE 25 MCG: 50 INJECTION, SOLUTION INTRAMUSCULAR; INTRAVENOUS at 08:59

## 2024-03-06 ASSESSMENT — PAIN SCALES - GENERAL
PAINLEVEL_OUTOF10: 3
PAINLEVEL_OUTOF10: 0 - NO PAIN
PAINLEVEL_OUTOF10: 1
PAINLEVEL_OUTOF10: 0 - NO PAIN
PAINLEVEL_OUTOF10: 3
PAINLEVEL_OUTOF10: 0 - NO PAIN
PAINLEVEL_OUTOF10: 3
PAINLEVEL_OUTOF10: 0 - NO PAIN

## 2024-03-06 ASSESSMENT — ENCOUNTER SYMPTOMS
NAUSEA: 1
RESPIRATORY NEGATIVE: 1
VOMITING: 1
CONSTITUTIONAL NEGATIVE: 1
CARDIOVASCULAR NEGATIVE: 1

## 2024-03-06 ASSESSMENT — COLUMBIA-SUICIDE SEVERITY RATING SCALE - C-SSRS
1. IN THE PAST MONTH, HAVE YOU WISHED YOU WERE DEAD OR WISHED YOU COULD GO TO SLEEP AND NOT WAKE UP?: NO
6. HAVE YOU EVER DONE ANYTHING, STARTED TO DO ANYTHING, OR PREPARED TO DO ANYTHING TO END YOUR LIFE?: NO
2. HAVE YOU ACTUALLY HAD ANY THOUGHTS OF KILLING YOURSELF?: NO

## 2024-03-06 ASSESSMENT — PAIN - FUNCTIONAL ASSESSMENT
PAIN_FUNCTIONAL_ASSESSMENT: 0-10

## 2024-03-06 NOTE — H&P
History Of Present Illness  Shruthi Fan is a 63 y.o. female presenting with prior history of achalasia and peroral endoscopic myotomy.  She now presents with episodes of hematemesis, heartburn, and recurrent dysphagia..     Past Medical History  No past medical history on file.    Surgical History  Past Surgical History:   Procedure Laterality Date    MR HEAD ANGIO WO IV CONTRAST  7/25/2022    MR HEAD ANGIO WO IV CONTRAST 7/25/2022    MR NECK ANGIO WO IV CONTRAST  7/25/2022    MR NECK ANGIO WO IV CONTRAST 7/25/2022    OTHER SURGICAL HISTORY  10/08/2019    Carpal tunnel surgery    OTHER SURGICAL HISTORY  10/08/2019    Gallbladder surgery    OTHER SURGICAL HISTORY  10/08/2019    Lumpectomy    OTHER SURGICAL HISTORY  11/14/2019    Knee surgery        Social History  She reports that she has never smoked. She has never used smokeless tobacco. She reports that she does not use drugs. No history on file for alcohol use.    Family History  Family History   Problem Relation Name Age of Onset    Lung cancer Mother      Breast cancer Mother      Other (CARDIAC DISORDER) Father      Diabetes Father      Hypertension Father      Lung cancer Father      Lung cancer Brother          Allergies  Bacitracin zinc-polymyxin b, Budesonide-formoterol, Doxycycline, Fenofibrate, Fish containing products, Iodinated contrast media, Lisinopril, Midazolam, Neosporin (jzu-wdx-lsesv) [neomycin-bacitracnzn-polymyxnb], Nitrofurantoin macrocrystal, Nut - unspecified, Omnicef [cefdinir], Penicillins, Propoxyphene, Shellfish containing products, Simvastatin, Latex, Niacin, and Other    Review of Systems   Constitutional: Negative.    HENT: Negative.     Respiratory: Negative.     Cardiovascular: Negative.    Gastrointestinal:  Positive for nausea and vomiting.   Genitourinary: Negative.         Physical Exam  Constitutional:       Appearance: Normal appearance.   Abdominal:      General: There is distension.      Palpations: Abdomen is soft.    Musculoskeletal:         General: Normal range of motion.   Skin:     General: Skin is warm and dry.   Neurological:      Mental Status: She is alert.          Last Recorded Vitals  There were no vitals taken for this visit.    Relevant Results        none     Assessment/Plan   Active Problems:  There are no active Hospital Problems.      Patient has known achalasia and question for reflux or incomplete myotomy.  Will undergo Bravo capsule placement and motility catheter placement.       I spent 10 minutes in the professional and overall care of this patient.      Dipak De Leon MD

## 2024-03-15 PROCEDURE — 91038 ESOPH IMPED FUNCT TEST > 1HR: CPT | Performed by: SURGERY

## 2024-03-20 DIAGNOSIS — R35.1 NOCTURIA: ICD-10-CM

## 2024-03-20 DIAGNOSIS — N30.10 INTERSTITIAL CYSTITIS: ICD-10-CM

## 2024-03-20 RX ORDER — VIBEGRON 75 MG/1
1 TABLET, FILM COATED ORAL DAILY
Qty: 90 TABLET | Refills: 3 | Status: SHIPPED | OUTPATIENT
Start: 2024-03-20 | End: 2025-03-20

## 2024-03-20 RX ORDER — URINARY ANTISEPTIC ANTISPASMODIC 81.6; 40.8; 10.8; .12 MG/1; MG/1; MG/1; MG/1
TABLET ORAL
Qty: 90 TABLET | Refills: 3 | Status: SHIPPED | OUTPATIENT
Start: 2024-03-20

## 2024-04-01 ENCOUNTER — TELEPHONE (OUTPATIENT)
Dept: GASTROENTEROLOGY | Facility: HOSPITAL | Age: 64
End: 2024-04-01
Payer: COMMERCIAL

## 2024-05-06 NOTE — OP NOTE
PREOPERATIVE DIAGNOSIS:  Achalasia.    POSTOPERATIVE DIAGNOSIS:  Achalasia.    OPERATION/PROCEDURE:  Per oral endoscopic myotomy.    SURGEON:  Dipak De Leon MD.    ASSISTANT(S):    ANESTHESIA:  General endotracheal.    COMPLICATION:  None.    INDICATIONS:  The patient is a 62-year-old female with a longstanding history of  dysphagia, identified manometrically to have type 2 achalasia, now  taken to the operating room for elective peroral endoscopic myotomy  after being explained the risks, benefits, alternatives, and  complications of surgery.     DESCRIPTION OF PROCEDURE:  The patient was taken to the operating room and placed on the  operative table in supine position.  Following induction of general  anesthetic, the patient was intubated endotracheally.  Time-out was  performed per protocol and she received preoperative IV antibiotics,  but no DVT prophylaxis was deemed necessary.  Endoscope mounted with  Overtube was advanced in cervical esophagus.  Esophagus was somewhat  dilated and tortuous and there was a small amount of salivary fluid,  which was suctioned away.  The EG junction was measured at 40 cm from  the incisors after the Overtube was advanced.  Soft oblique EMR cap  was placed on the endoscope.  Starting 14 cm proximal to this at 26  cm from incisors, the anterior wall of the esophagus was confirmed  with instillation of methylene blue into the esophagus and watching  it layer out posteriorly while the patient was supine.  Approximately  10 cc of methylene blue with dextrose and epinephrine solution was  injected to make a submucosal wheal on the anterior wall of the  esophagus.  2 cm mucosotomy was performed with a triangle-tip knife.  Endoscope was advanced in the submucosal tunnel, advanced all the way  onto the gastric wall for approximately 2 cm.  This was confirmed  with retroflexed view in the gastric lumen.  Starting 3 cm inferior  to the inferior aspect of the mucosotomy,  circular muscle fibers were  fully divided with spontaneous splitting of the longitudinal fibers  as expected.  The myotomy was completed all the way to the end of the  submucosal tunnel.  After completion the myotomy, endoscope was  returned to the native lumen and the EG junction was now widely  gaping as before it was quite hypertonic.  There was no evidence of  mucosal injury.  Endoscope was returned to the submucosal tunnel.  There was no evidence of bleeding.  Mucosotomy was closed with  multiple endoscopic clips.  Stomach was desufflated and there were no  further findings.  Endoscope and Overtube were withdrawn.  The  patient tolerated the procedure well and was taken to the recovery  room in stable condition.  Surgeon and surgical resident were present  throughout the entire procedure.       Dipak De Leon MD    DD:  05/19/2023 09:52:18 EST  DT:  05/19/2023 11:20:54 EST  DICTATION NUMBER:  [id number]  INTERNAL JOB NUMBER:  [id number]    CC:  UNKNOWN UNKNOWN  Dipak De Leon MD        Electronic Signatures:  Dipak De Leon) (Signed on 19-May-2023 11:46)   Authored  Unsigned, Draft (SYS GENERATED) (Entered on 19-May-2023 11:20)   Entered    Last Updated: 19-May-2023 11:46 by Dipak De Leon)

## 2024-05-28 DIAGNOSIS — N39.0 URINARY TRACT INFECTION WITHOUT HEMATURIA, SITE UNSPECIFIED: ICD-10-CM

## 2024-05-28 RX ORDER — CIPROFLOXACIN 500 MG/1
500 TABLET ORAL 2 TIMES DAILY
Qty: 10 TABLET | Refills: 0 | Status: SHIPPED | OUTPATIENT
Start: 2024-05-28 | End: 2024-06-02

## 2024-06-17 ENCOUNTER — APPOINTMENT (OUTPATIENT)
Dept: UROLOGY | Facility: CLINIC | Age: 64
End: 2024-06-17
Payer: COMMERCIAL

## 2024-06-20 ENCOUNTER — APPOINTMENT (OUTPATIENT)
Dept: UROLOGY | Facility: CLINIC | Age: 64
End: 2024-06-20
Payer: COMMERCIAL

## 2024-06-20 VITALS — RESPIRATION RATE: 16 BRPM | BODY MASS INDEX: 31.78 KG/M2 | WEIGHT: 191 LBS

## 2024-06-20 DIAGNOSIS — R30.0 DYSURIA: ICD-10-CM

## 2024-06-20 DIAGNOSIS — R31.0 GROSS HEMATURIA: ICD-10-CM

## 2024-06-20 DIAGNOSIS — N39.3 SUI (STRESS URINARY INCONTINENCE, FEMALE): ICD-10-CM

## 2024-06-20 DIAGNOSIS — N30.10 INTERSTITIAL CYSTITIS: ICD-10-CM

## 2024-06-20 DIAGNOSIS — R35.1 NOCTURIA: ICD-10-CM

## 2024-06-20 DIAGNOSIS — R35.0 URINARY FREQUENCY: ICD-10-CM

## 2024-06-20 LAB
POC APPEARANCE, URINE: CLEAR
POC BILIRUBIN, URINE: NEGATIVE
POC BLOOD, URINE: ABNORMAL
POC COLOR, URINE: YELLOW
POC GLUCOSE, URINE: ABNORMAL MG/DL
POC KETONES, URINE: NEGATIVE MG/DL
POC LEUKOCYTES, URINE: NEGATIVE
POC NITRITE,URINE: NEGATIVE
POC PH, URINE: 5.5 PH
POC PROTEIN, URINE: ABNORMAL MG/DL
POC SPECIFIC GRAVITY, URINE: >=1.03
POC UROBILINOGEN, URINE: 0.2 EU/DL

## 2024-06-20 PROCEDURE — 81003 URINALYSIS AUTO W/O SCOPE: CPT | Performed by: UROLOGY

## 2024-06-20 PROCEDURE — 1036F TOBACCO NON-USER: CPT | Performed by: UROLOGY

## 2024-06-20 PROCEDURE — 99213 OFFICE O/P EST LOW 20 MIN: CPT | Performed by: UROLOGY

## 2024-06-20 ASSESSMENT — ENCOUNTER SYMPTOMS
NAUSEA: 0
EYES NEGATIVE: 1
SHORTNESS OF BREATH: 0
FEVER: 0
CHILLS: 0
PSYCHIATRIC NEGATIVE: 1
ENDOCRINE NEGATIVE: 1
ALLERGIC/IMMUNOLOGIC NEGATIVE: 1
COUGH: 0
DIFFICULTY URINATING: 0

## 2024-06-20 NOTE — PROGRESS NOTES
Subjective   Patient ID: Shruthi Fan is a 63 y.o. female.    HPI  Patient is here for recurrent UTI. She recently finished an antbx and states sx have already returned. She states her sx were hematuria. Dysuria, icontinence,. .  Hx of IC.   She has done DMSO irrigations. Last cysto on 3/23. Chronic LUT'S sx are mild and stable. Some urgency and frequency. Nocturia x1. She is taking Gemtesa which has improved frequency.       Review of Systems   Constitutional:  Negative for chills and fever.   HENT: Negative.     Eyes: Negative.    Respiratory:  Negative for cough and shortness of breath.    Cardiovascular:  Negative for chest pain and leg swelling.   Gastrointestinal:  Negative for nausea.   Endocrine: Negative.    Genitourinary:  Negative for difficulty urinating.        Negative except for documented in HPI   Allergic/Immunologic: Negative.    Neurological:         Alert & oriented X 3   Hematological:         Denies blood thinners   Psychiatric/Behavioral: Negative.         Objective   Physical Exam  Vitals and nursing note reviewed.   Pulmonary:      Effort: Pulmonary effort is normal.   Abdominal:      Palpations: Abdomen is soft.      Tenderness: There is no abdominal tenderness.   Genitourinary:     Comments: Kidneys non palpable bilaterally  Bladder non palpable or tender  Neurological:      Mental Status: She is alert.         Assessment/Plan   Diagnoses and all orders for this visit:  Interstitial cystitis  Nocturia  -     POCT UA Automated manually resulted  ELAINA (stress urinary incontinence, female)  Urinary frequency  Dysuria    Past CT reviewed  F/U CT ordered for gross hematuria and hepatic cysts  Past Cysto notes reviewed  Treatment options for LUTS reviewed  Discussed timed voiding. Discussed fluid and caffeine intake  Lifestyle change to help prevent UTIs discussed. Encouraged fluid intake.      F/u  after CT

## 2024-06-26 DIAGNOSIS — R31.0 GROSS HEMATURIA: Primary | ICD-10-CM

## 2024-06-27 RX ORDER — PREDNISONE 50 MG/1
TABLET ORAL
Qty: 3 TABLET | Refills: 0 | Status: SHIPPED | OUTPATIENT
Start: 2024-06-27

## 2024-07-02 ENCOUNTER — HOSPITAL ENCOUNTER (OUTPATIENT)
Dept: RADIOLOGY | Facility: HOSPITAL | Age: 64
Discharge: HOME | End: 2024-07-02
Payer: COMMERCIAL

## 2024-07-02 ENCOUNTER — APPOINTMENT (OUTPATIENT)
Dept: RADIOLOGY | Facility: HOSPITAL | Age: 64
End: 2024-07-02
Payer: COMMERCIAL

## 2024-07-02 ENCOUNTER — LAB (OUTPATIENT)
Dept: LAB | Facility: LAB | Age: 64
End: 2024-07-02
Payer: COMMERCIAL

## 2024-07-02 DIAGNOSIS — R31.0 GROSS HEMATURIA: ICD-10-CM

## 2024-07-02 DIAGNOSIS — R35.1 NOCTURIA: ICD-10-CM

## 2024-07-02 LAB
CREAT SERPL-MCNC: 0.79 MG/DL (ref 0.5–1.05)
EGFRCR SERPLBLD CKD-EPI 2021: 84 ML/MIN/1.73M*2

## 2024-07-02 PROCEDURE — 2550000001 HC RX 255 CONTRASTS: Performed by: UROLOGY

## 2024-07-02 PROCEDURE — A9698 NON-RAD CONTRAST MATERIALNOC: HCPCS | Performed by: UROLOGY

## 2024-07-02 PROCEDURE — 74176 CT ABD & PELVIS W/O CONTRAST: CPT

## 2024-07-02 PROCEDURE — 82565 ASSAY OF CREATININE: CPT

## 2024-07-02 PROCEDURE — 36415 COLL VENOUS BLD VENIPUNCTURE: CPT

## 2024-07-02 PROCEDURE — 74176 CT ABD & PELVIS W/O CONTRAST: CPT | Performed by: RADIOLOGY

## 2024-07-22 ENCOUNTER — APPOINTMENT (OUTPATIENT)
Dept: UROLOGY | Facility: CLINIC | Age: 64
End: 2024-07-22
Payer: COMMERCIAL

## 2024-08-02 ASSESSMENT — ENCOUNTER SYMPTOMS
FEVER: 0
SHORTNESS OF BREATH: 0
ENDOCRINE NEGATIVE: 1
NAUSEA: 0
COUGH: 0
ALLERGIC/IMMUNOLOGIC NEGATIVE: 1
PSYCHIATRIC NEGATIVE: 1
CHILLS: 0
EYES NEGATIVE: 1
DIFFICULTY URINATING: 0

## 2024-08-02 NOTE — PROGRESS NOTES
Subjective   Patient ID: Shruthi Fan is a 63 y.o. female.    HPI  Patient is here for CT results. Hx of microhematuria. CT showed 1.3 cm hypodense nodule lower pole of right kidney laterally, probably a proteinaceous cyst. However follow-up is recommended. Hx of recurrent UTI'S. She has been treated several times recently. Hx of IC.   She has done DMSO irrigations. Last cysto on 3/23. Chronic LUT'S sx are mild and stable. Some urgency and frequency. Nocturia x1. She is taking Gemtesa which has improved frequency.       Review of Systems   Constitutional:  Negative for chills and fever.   HENT: Negative.     Eyes: Negative.    Respiratory:  Negative for cough and shortness of breath.    Cardiovascular:  Negative for chest pain and leg swelling.   Gastrointestinal:  Negative for nausea.   Endocrine: Negative.    Genitourinary:  Negative for difficulty urinating.        Negative except for documented in HPI   Allergic/Immunologic: Negative.    Neurological:         Alert & oriented X 3   Hematological:         Denies blood thinners   Psychiatric/Behavioral: Negative.         Objective   Physical Exam  Vitals and nursing note reviewed.   Pulmonary:      Effort: Pulmonary effort is normal.   Abdominal:      Palpations: Abdomen is soft.      Tenderness: There is no abdominal tenderness.   Genitourinary:     Comments: Kidneys non palpable bilaterally  Bladder non palpable or tender  Neurological:      Mental Status: She is alert.         Assessment/Plan   Diagnoses and all orders for this visit:  Interstitial cystitis  Nocturia  ELAINA (stress urinary incontinence, female)  Urinary frequency  Gross hematuria      Treatment options for LUTS reviewed  Gemtesa Rx given  Discussed timed voiding. Discussed fluid and caffeine intake  Lifestyle change to help prevent UTIs discussed. Encouraged fluid intake.  CT reviewed-Needs repeat imaging in 6 months  Cysto scheduled      F/u cysto then plan F/U renal imaging 6 months

## 2024-08-05 ENCOUNTER — APPOINTMENT (OUTPATIENT)
Dept: UROLOGY | Facility: CLINIC | Age: 64
End: 2024-08-05
Payer: COMMERCIAL

## 2024-08-05 VITALS
HEART RATE: 86 BPM | SYSTOLIC BLOOD PRESSURE: 119 MMHG | DIASTOLIC BLOOD PRESSURE: 74 MMHG | WEIGHT: 195 LBS | BODY MASS INDEX: 32.45 KG/M2

## 2024-08-05 DIAGNOSIS — R31.0 GROSS HEMATURIA: ICD-10-CM

## 2024-08-05 DIAGNOSIS — R35.0 URINARY FREQUENCY: ICD-10-CM

## 2024-08-05 DIAGNOSIS — R35.1 NOCTURIA: ICD-10-CM

## 2024-08-05 DIAGNOSIS — N30.10 INTERSTITIAL CYSTITIS: ICD-10-CM

## 2024-08-05 DIAGNOSIS — N39.3 SUI (STRESS URINARY INCONTINENCE, FEMALE): ICD-10-CM

## 2024-08-05 PROCEDURE — 1036F TOBACCO NON-USER: CPT | Performed by: UROLOGY

## 2024-08-05 PROCEDURE — 99214 OFFICE O/P EST MOD 30 MIN: CPT | Performed by: UROLOGY

## 2024-08-22 ENCOUNTER — APPOINTMENT (OUTPATIENT)
Dept: UROLOGY | Facility: CLINIC | Age: 64
End: 2024-08-22
Payer: COMMERCIAL

## 2024-09-17 NOTE — PROGRESS NOTES
Patient ID: Shruthi Fan is a 63 y.o. female.    Procedures  The patient was prepped using a Betadine solution. Lidocaine jelly was instilled into the urethra. The flexible cystoscope was sterilely inserted into the urethra and formal cystoscopy performed in a systematic fashion. . For detailed findings of the procedure, please see Dr. Calix remarks below  CIPRO 250MG POBID TIMES 3 DAYS GIVEN    CT 7/2/2024  IMPRESSION:  No acute abnormality within the abdomen or pelvis.      Hepatomegaly with fatty change.      No renal calculus disease or hydronephrosis. 1.3 cm hypodense nodule  lower pole of right kidney laterally, probably a proteinaceous cyst.  However follow-up is recommended.      Status post hysterectomy and cholecystectomy.  Scoliosis of the lumbar spine with degenerative changes in the lumbar  spine and in the sacroiliac joints.    NO MASS. NO STONE. MINIMAL INFLAMATION. ONE AREA LEFT SIDEWALL/DOME LOOKED ERYTHEMATOUS-NOT REALLY SUSPICIOUS OR CONCERNING BUT WILL REPEAT CYSTO AT F/U    F/U 6 MONTHS WITH RENAL U/S AND URINE CYTOLOGY AND CYSTO

## 2024-09-19 ENCOUNTER — APPOINTMENT (OUTPATIENT)
Dept: UROLOGY | Facility: CLINIC | Age: 64
End: 2024-09-19
Payer: COMMERCIAL

## 2024-09-19 VITALS — WEIGHT: 193 LBS | HEIGHT: 65 IN | BODY MASS INDEX: 32.15 KG/M2

## 2024-09-19 DIAGNOSIS — R31.9 HEMATURIA, UNSPECIFIED TYPE: Primary | ICD-10-CM

## 2024-09-19 PROCEDURE — 52000 CYSTOURETHROSCOPY: CPT | Performed by: UROLOGY

## 2024-09-19 RX ORDER — CIPROFLOXACIN 250 MG/1
250 TABLET, FILM COATED ORAL 2 TIMES DAILY
Qty: 6 TABLET | Refills: 0 | Status: SHIPPED | OUTPATIENT
Start: 2024-09-19 | End: 2024-09-22

## 2024-12-18 ENCOUNTER — APPOINTMENT (OUTPATIENT)
Dept: URBAN - METROPOLITAN AREA CLINIC 204 | Age: 64
Setting detail: DERMATOLOGY
End: 2024-12-18

## 2024-12-18 PROCEDURE — OTHER MIPS QUALITY: OTHER

## 2024-12-18 PROCEDURE — 99213 OFFICE O/P EST LOW 20 MIN: CPT

## 2025-03-19 ENCOUNTER — HOSPITAL ENCOUNTER (OUTPATIENT)
Dept: RADIOLOGY | Facility: CLINIC | Age: 65
Discharge: HOME | End: 2025-03-19
Payer: COMMERCIAL

## 2025-03-19 DIAGNOSIS — R31.9 HEMATURIA, UNSPECIFIED TYPE: ICD-10-CM

## 2025-03-19 PROCEDURE — 76770 US EXAM ABDO BACK WALL COMP: CPT

## 2025-04-07 ENCOUNTER — TELEPHONE (OUTPATIENT)
Dept: UROLOGY | Facility: CLINIC | Age: 65
End: 2025-04-07
Payer: COMMERCIAL

## 2025-04-07 NOTE — TELEPHONE ENCOUNTER
RN returned call from PT to discuss record release and prescription renewal. PT to return to office on 4/9/25.

## 2025-04-09 ENCOUNTER — TELEPHONE (OUTPATIENT)
Dept: UROLOGY | Facility: CLINIC | Age: 65
End: 2025-04-09
Payer: COMMERCIAL

## 2025-04-10 DIAGNOSIS — N39.3 SUI (STRESS URINARY INCONTINENCE, FEMALE): Primary | ICD-10-CM

## 2025-04-10 RX ORDER — VIBEGRON 75 MG/1
75 TABLET, FILM COATED ORAL DAILY
Qty: 90 TABLET | Refills: 1 | Status: SHIPPED | OUTPATIENT
Start: 2025-04-10

## 2025-04-14 ENCOUNTER — APPOINTMENT (OUTPATIENT)
Dept: UROLOGY | Facility: CLINIC | Age: 65
End: 2025-04-14
Payer: COMMERCIAL